# Patient Record
Sex: FEMALE | Race: WHITE | NOT HISPANIC OR LATINO
[De-identification: names, ages, dates, MRNs, and addresses within clinical notes are randomized per-mention and may not be internally consistent; named-entity substitution may affect disease eponyms.]

---

## 2019-03-29 PROBLEM — Z00.00 ENCOUNTER FOR PREVENTIVE HEALTH EXAMINATION: Status: ACTIVE | Noted: 2019-03-29

## 2019-04-17 ENCOUNTER — APPOINTMENT (OUTPATIENT)
Dept: MAMMOGRAPHY | Facility: CLINIC | Age: 64
End: 2019-04-17
Payer: MEDICARE

## 2019-04-17 ENCOUNTER — APPOINTMENT (OUTPATIENT)
Dept: RADIOLOGY | Facility: CLINIC | Age: 64
End: 2019-04-17
Payer: MEDICARE

## 2019-04-17 PROCEDURE — 77063 BREAST TOMOSYNTHESIS BI: CPT

## 2019-04-17 PROCEDURE — 77080 DXA BONE DENSITY AXIAL: CPT

## 2019-04-17 PROCEDURE — 77067 SCR MAMMO BI INCL CAD: CPT

## 2019-04-23 ENCOUNTER — OUTPATIENT (OUTPATIENT)
Dept: OUTPATIENT SERVICES | Facility: HOSPITAL | Age: 64
LOS: 1 days | End: 2019-04-23

## 2019-11-23 ENCOUNTER — TRANSCRIPTION ENCOUNTER (OUTPATIENT)
Age: 64
End: 2019-11-23

## 2019-12-11 ENCOUNTER — APPOINTMENT (OUTPATIENT)
Dept: RADIOLOGY | Facility: CLINIC | Age: 64
End: 2019-12-11
Payer: MEDICARE

## 2019-12-11 PROCEDURE — 71046 X-RAY EXAM CHEST 2 VIEWS: CPT

## 2019-12-15 ENCOUNTER — TRANSCRIPTION ENCOUNTER (OUTPATIENT)
Age: 64
End: 2019-12-15

## 2020-10-13 ENCOUNTER — APPOINTMENT (OUTPATIENT)
Dept: ULTRASOUND IMAGING | Facility: CLINIC | Age: 65
End: 2020-10-13

## 2020-12-02 ENCOUNTER — APPOINTMENT (OUTPATIENT)
Dept: MAMMOGRAPHY | Facility: CLINIC | Age: 65
End: 2020-12-02
Payer: MEDICARE

## 2020-12-02 PROCEDURE — 77063 BREAST TOMOSYNTHESIS BI: CPT

## 2020-12-02 PROCEDURE — 77067 SCR MAMMO BI INCL CAD: CPT

## 2021-05-31 ENCOUNTER — EMERGENCY (EMERGENCY)
Facility: HOSPITAL | Age: 66
LOS: 1 days | End: 2021-05-31
Admitting: EMERGENCY MEDICINE
Payer: MEDICARE

## 2021-05-31 PROCEDURE — 70450 CT HEAD/BRAIN W/O DYE: CPT | Mod: 26,59

## 2021-05-31 PROCEDURE — 99285 EMERGENCY DEPT VISIT HI MDM: CPT

## 2021-05-31 PROCEDURE — 93010 ELECTROCARDIOGRAM REPORT: CPT

## 2021-05-31 PROCEDURE — 70496 CT ANGIOGRAPHY HEAD: CPT | Mod: 26

## 2021-05-31 PROCEDURE — 70498 CT ANGIOGRAPHY NECK: CPT | Mod: 26

## 2021-05-31 PROCEDURE — 71045 X-RAY EXAM CHEST 1 VIEW: CPT | Mod: 26

## 2021-06-01 ENCOUNTER — INPATIENT (INPATIENT)
Facility: HOSPITAL | Age: 66
LOS: 0 days | Discharge: HOME | DRG: 103 | End: 2021-06-01
Attending: NEUROLOGICAL SURGERY | Admitting: NEUROLOGICAL SURGERY
Payer: COMMERCIAL

## 2021-06-01 ENCOUNTER — TRANSCRIPTION ENCOUNTER (OUTPATIENT)
Age: 66
End: 2021-06-01

## 2021-06-01 VITALS
TEMPERATURE: 98 F | RESPIRATION RATE: 16 BRPM | DIASTOLIC BLOOD PRESSURE: 64 MMHG | HEART RATE: 66 BPM | SYSTOLIC BLOOD PRESSURE: 131 MMHG | OXYGEN SATURATION: 99 %

## 2021-06-01 VITALS
DIASTOLIC BLOOD PRESSURE: 72 MMHG | OXYGEN SATURATION: 100 % | SYSTOLIC BLOOD PRESSURE: 124 MMHG | TEMPERATURE: 98 F | HEART RATE: 60 BPM | RESPIRATION RATE: 16 BRPM

## 2021-06-01 DIAGNOSIS — Z98.891 HISTORY OF UTERINE SCAR FROM PREVIOUS SURGERY: Chronic | ICD-10-CM

## 2021-06-01 DIAGNOSIS — I67.1 CEREBRAL ANEURYSM, NONRUPTURED: ICD-10-CM

## 2021-06-01 LAB
A1C WITH ESTIMATED AVERAGE GLUCOSE RESULT: 5.4 % — SIGNIFICANT CHANGE UP (ref 4–5.6)
ANION GAP SERPL CALC-SCNC: 8 MMOL/L — SIGNIFICANT CHANGE UP (ref 5–17)
APPEARANCE CSF: CLEAR — SIGNIFICANT CHANGE UP
APPEARANCE SPUN FLD: COLORLESS — SIGNIFICANT CHANGE UP
BUN SERPL-MCNC: 11.9 MG/DL — SIGNIFICANT CHANGE UP (ref 8–20)
CALCIUM SERPL-MCNC: 8.9 MG/DL — SIGNIFICANT CHANGE UP (ref 8.6–10.2)
CHLORIDE SERPL-SCNC: 107 MMOL/L — SIGNIFICANT CHANGE UP (ref 98–107)
CHOLEST SERPL-MCNC: 146 MG/DL — SIGNIFICANT CHANGE UP
CO2 SERPL-SCNC: 22 MMOL/L — SIGNIFICANT CHANGE UP (ref 22–29)
COLOR CSF: SIGNIFICANT CHANGE UP
CREAT SERPL-MCNC: 0.65 MG/DL — SIGNIFICANT CHANGE UP (ref 0.5–1.3)
CSF COMMENTS: SIGNIFICANT CHANGE UP
CSF COMMENTS: SIGNIFICANT CHANGE UP
ESTIMATED AVERAGE GLUCOSE: 108 MG/DL — SIGNIFICANT CHANGE UP (ref 68–114)
FOLATE SERPL-MCNC: 12.9 NG/ML — SIGNIFICANT CHANGE UP
GLUCOSE CSF-MCNC: 72 MG/DLG/24H — HIGH (ref 40–70)
GLUCOSE SERPL-MCNC: 112 MG/DL — HIGH (ref 70–99)
GRAM STN FLD: SIGNIFICANT CHANGE UP
HCT VFR BLD CALC: 37.5 % — SIGNIFICANT CHANGE UP (ref 34.5–45)
HDLC SERPL-MCNC: 48 MG/DL — LOW
HGB BLD-MCNC: 11.6 G/DL — SIGNIFICANT CHANGE UP (ref 11.5–15.5)
LIPID PNL WITH DIRECT LDL SERPL: 77 MG/DL — SIGNIFICANT CHANGE UP
MAGNESIUM SERPL-MCNC: 1.9 MG/DL — SIGNIFICANT CHANGE UP (ref 1.6–2.6)
MCHC RBC-ENTMCNC: 30.9 GM/DL — LOW (ref 32–36)
MCHC RBC-ENTMCNC: 32.7 PG — SIGNIFICANT CHANGE UP (ref 27–34)
MCV RBC AUTO: 105.6 FL — HIGH (ref 80–100)
NEUTROPHILS # CSF: SIGNIFICANT CHANGE UP % (ref 0–6)
NON HDL CHOLESTEROL: 98 MG/DL — SIGNIFICANT CHANGE UP
NRBC NFR CSF: 2 /UL — SIGNIFICANT CHANGE UP (ref 0–5)
PHOSPHATE SERPL-MCNC: 2.8 MG/DL — SIGNIFICANT CHANGE UP (ref 2.4–4.7)
PLATELET # BLD AUTO: 154 K/UL — SIGNIFICANT CHANGE UP (ref 150–400)
POTASSIUM SERPL-MCNC: 4.7 MMOL/L — SIGNIFICANT CHANGE UP (ref 3.5–5.3)
POTASSIUM SERPL-SCNC: 4.7 MMOL/L — SIGNIFICANT CHANGE UP (ref 3.5–5.3)
PROT CSF-MCNC: 24 MG/DL — SIGNIFICANT CHANGE UP (ref 15–45)
RBC # BLD: 3.55 M/UL — LOW (ref 3.8–5.2)
RBC # CSF: 22 /CMM — HIGH (ref 0–1)
RBC # FLD: 12.8 % — SIGNIFICANT CHANGE UP (ref 10.3–14.5)
SODIUM SERPL-SCNC: 137 MMOL/L — SIGNIFICANT CHANGE UP (ref 135–145)
SPECIMEN SOURCE: SIGNIFICANT CHANGE UP
TRIGL SERPL-MCNC: 106 MG/DL — SIGNIFICANT CHANGE UP
TUBE TYPE: SIGNIFICANT CHANGE UP
VIT B12 SERPL-MCNC: 353 PG/ML — SIGNIFICANT CHANGE UP (ref 232–1245)
WBC # BLD: 5.17 K/UL — SIGNIFICANT CHANGE UP (ref 3.8–10.5)
WBC # FLD AUTO: 5.17 K/UL — SIGNIFICANT CHANGE UP (ref 3.8–10.5)

## 2021-06-01 PROCEDURE — 99234 HOSP IP/OBS SM DT SF/LOW 45: CPT

## 2021-06-01 PROCEDURE — 93010 ELECTROCARDIOGRAM REPORT: CPT

## 2021-06-01 PROCEDURE — 99232 SBSQ HOSP IP/OBS MODERATE 35: CPT

## 2021-06-01 PROCEDURE — 99239 HOSP IP/OBS DSCHRG MGMT >30: CPT

## 2021-06-01 PROCEDURE — 89051 BODY FLUID CELL COUNT: CPT

## 2021-06-01 PROCEDURE — 82746 ASSAY OF FOLIC ACID SERUM: CPT

## 2021-06-01 PROCEDURE — 97167 OT EVAL HIGH COMPLEX 60 MIN: CPT

## 2021-06-01 PROCEDURE — 36415 COLL VENOUS BLD VENIPUNCTURE: CPT

## 2021-06-01 PROCEDURE — 80061 LIPID PANEL: CPT

## 2021-06-01 PROCEDURE — 93306 TTE W/DOPPLER COMPLETE: CPT | Mod: 26

## 2021-06-01 PROCEDURE — 87070 CULTURE OTHR SPECIMN AEROBIC: CPT

## 2021-06-01 PROCEDURE — 82607 VITAMIN B-12: CPT

## 2021-06-01 PROCEDURE — 72148 MRI LUMBAR SPINE W/O DYE: CPT | Mod: 26

## 2021-06-01 PROCEDURE — 99285 EMERGENCY DEPT VISIT HI MDM: CPT | Mod: 25

## 2021-06-01 PROCEDURE — 70544 MR ANGIOGRAPHY HEAD W/O DYE: CPT | Mod: 26,59

## 2021-06-01 PROCEDURE — 87205 SMEAR GRAM STAIN: CPT

## 2021-06-01 PROCEDURE — 70551 MRI BRAIN STEM W/O DYE: CPT | Mod: 26

## 2021-06-01 PROCEDURE — 72148 MRI LUMBAR SPINE W/O DYE: CPT

## 2021-06-01 PROCEDURE — 80048 BASIC METABOLIC PNL TOTAL CA: CPT

## 2021-06-01 PROCEDURE — 83735 ASSAY OF MAGNESIUM: CPT

## 2021-06-01 PROCEDURE — 70544 MR ANGIOGRAPHY HEAD W/O DYE: CPT

## 2021-06-01 PROCEDURE — 70551 MRI BRAIN STEM W/O DYE: CPT

## 2021-06-01 PROCEDURE — 84100 ASSAY OF PHOSPHORUS: CPT

## 2021-06-01 PROCEDURE — 72141 MRI NECK SPINE W/O DYE: CPT

## 2021-06-01 PROCEDURE — 72141 MRI NECK SPINE W/O DYE: CPT | Mod: 26

## 2021-06-01 PROCEDURE — C8929: CPT

## 2021-06-01 PROCEDURE — 93005 ELECTROCARDIOGRAM TRACING: CPT

## 2021-06-01 PROCEDURE — 83036 HEMOGLOBIN GLYCOSYLATED A1C: CPT

## 2021-06-01 PROCEDURE — 62270 DX LMBR SPI PNXR: CPT

## 2021-06-01 PROCEDURE — 85027 COMPLETE CBC AUTOMATED: CPT

## 2021-06-01 PROCEDURE — 72146 MRI CHEST SPINE W/O DYE: CPT | Mod: 26

## 2021-06-01 PROCEDURE — 84157 ASSAY OF PROTEIN OTHER: CPT

## 2021-06-01 PROCEDURE — 72146 MRI CHEST SPINE W/O DYE: CPT

## 2021-06-01 PROCEDURE — 82945 GLUCOSE OTHER FLUID: CPT

## 2021-06-01 RX ORDER — HYDRALAZINE HCL 50 MG
10 TABLET ORAL
Refills: 0 | Status: DISCONTINUED | OUTPATIENT
Start: 2021-06-01 | End: 2021-06-01

## 2021-06-01 RX ORDER — CYCLOBENZAPRINE HYDROCHLORIDE 10 MG/1
0.5 TABLET, FILM COATED ORAL
Qty: 7 | Refills: 0
Start: 2021-06-01 | End: 2021-06-07

## 2021-06-01 RX ORDER — SODIUM CHLORIDE 9 MG/ML
1000 INJECTION INTRAMUSCULAR; INTRAVENOUS; SUBCUTANEOUS
Refills: 0 | Status: DISCONTINUED | OUTPATIENT
Start: 2021-06-01 | End: 2021-06-01

## 2021-06-01 RX ORDER — ACETAMINOPHEN 500 MG
650 TABLET ORAL EVERY 6 HOURS
Refills: 0 | Status: DISCONTINUED | OUTPATIENT
Start: 2021-06-01 | End: 2021-06-01

## 2021-06-01 RX ORDER — LABETALOL HCL 100 MG
10 TABLET ORAL
Refills: 0 | Status: DISCONTINUED | OUTPATIENT
Start: 2021-06-01 | End: 2021-06-01

## 2021-06-01 RX ORDER — ALPRAZOLAM 0.25 MG
1 TABLET ORAL
Qty: 3 | Refills: 0
Start: 2021-06-01 | End: 2021-06-03

## 2021-06-01 RX ORDER — ACETAMINOPHEN 500 MG
1000 TABLET ORAL ONCE
Refills: 0 | Status: COMPLETED | OUTPATIENT
Start: 2021-06-01 | End: 2021-06-01

## 2021-06-01 RX ORDER — ENOXAPARIN SODIUM 100 MG/ML
40 INJECTION SUBCUTANEOUS DAILY
Refills: 0 | Status: DISCONTINUED | OUTPATIENT
Start: 2021-06-01 | End: 2021-06-01

## 2021-06-01 RX ADMIN — Medication 0.5 MILLIGRAM(S): at 15:00

## 2021-06-01 RX ADMIN — Medication 400 MILLIGRAM(S): at 08:25

## 2021-06-01 RX ADMIN — Medication 650 MILLIGRAM(S): at 17:58

## 2021-06-01 RX ADMIN — Medication 1000 MILLIGRAM(S): at 08:55

## 2021-06-01 NOTE — ED ADULT NURSE REASSESSMENT NOTE - NS ED NURSE REASSESS COMMENT FT1
MD Ferguson and MD Stringer performed LP. Pt remained connected to continuous cardiac and pulse ox monitor for procedure. Pt laying flat for 1 hour. Neuro ICU at the bedside for a consult.

## 2021-06-01 NOTE — H&P ADULT - ASSESSMENT
65 year old female with a PMHx of HTN and anxiety presents as a transfer [place] [facility] after being found to have an ACOMM aneurysm. Earlier today she started to develop numbness and tingling down her LEFT arm and LEFT leg that prompted her to come to the ED. Upon work up CTH/CTA/CTP all performed and no acute intracranial finding or LVO was noted other then a 3mm ACOMM aneurysm. Patient was also found to have a low grade temp of 100.7 and was given a one time dose of Rocephin. She is currently now s/p LP performed in the ER.     -LP with +RBC  -ICU q 1 neuro checks  -SAH protocol  -IV Tylenol for HA  -Pre op for cerebral angiogram   -CTH 6am

## 2021-06-01 NOTE — ED PROVIDER NOTE - PHYSICAL EXAMINATION
General: Well appearing female in no acute distress. VSS on arrival   HEENT: Normocephalic, atraumatic. Moist mucous membranes. Oropharynx clear. No lymphadenopathy.  Eyes: No scleral icterus. EOMI. IRENA.  Neck:. Soft and supple. Full ROM without pain. No midline tenderness  Cardiac: Regular rate and regular rhythm. No murmurs, rubs, gallops. Peripheral pulses 2+ and symmetric. No LE edema.  Resp: Lungs CTAB. Speaking in full sentences. No wheezes, rales or rhonchi.  Abd: Soft, non-tender, non-distended. No guarding or rebound. No scars, masses, or lesions.  Back: Spine midline and non-tender. No CVA tenderness.    Skin: No rashes, abrasions, or lacerations.  Neuro: AO x 3. Moves all extremities symmetrically. Motor strength and sensation grossly intact.

## 2021-06-01 NOTE — OCCUPATIONAL THERAPY INITIAL EVALUATION ADULT - PERTINENT HX OF CURRENT PROBLEM, REHAB EVAL
Pt presents to ED as a transfer from [facility] for lumbar puncture. As per [facility] documents, pt with 100.7 fever and with CT scan head showing "3mm intracanicular artery aneursym.". CTA neck showed "patent cervical vasculture with no flow limiting stenosis or occlusion." Patient c/o headaches the last 3 days, left jaw pain, left UE/LE numbness and tingling. Pt s/p lumbar puncture which was positive for xanthochromia.

## 2021-06-01 NOTE — DISCHARGE NOTE PROVIDER - HOSPITAL COURSE
65 year old female with a PMHx of HTN and anxiety presents as a transfer from [facility] after being found to have an ACOMM aneurysm. Patient state that this past Thursday she started to develop headaches and by Friday her headache was so severe she went to her PMD. At that time she was prescribed tramadol and given a second agent for her HTN and sent home. Patient state that she did have some relief with the medication but still had headaches and were associated with nausea. Throughout the weekend again still with persistent headaches and started to develop LEFT sided jaw pain that kept her from fully opening her mouth. Earlier today she started to develop numbness and tingling down her LEFT arm and LEFT leg that prompted her to come to the ED. Upon work up CTH/CTA/CTP all performed and no acute intracranial finding or LVO was noted other then a 3mm ACOMM aneurysm. Patient was also found to have a low grade temp of 100.7 and was given a one time dose of Rocephin. She is s/p LP ny er and mr angio head done which was negative    tte negative  pt cleared patient and patient did admit to having stress and anxiety and hasn't   been sleeping well.     patient advised to see dr espinoza as outpatient and she is in agreement with plan     patuient denies sick contacts recent travel, confusion, dysuria, cough, diarrhea or any other complaints    time spent on dc 34 minutes    pe  PHYSICAL EXAM:    GENERAL: NAD, well-groomed, well-developed  HEAD:  Atraumatic, Normocephalic  EYES: EOMI, PERRLA, conjunctiva and sclera clear  ENMT: No tonsillar erythema, exudates, or enlargement; Moist mucous membranes, Good dentition, No lesions  NECK: Supple, tender along spine   NERVOUS SYSTEM:  Alert & Oriented X3, Good concentration;  CHEST/LUNG: Clear to percussion bilaterally; No rales, rhonchi, wheezing, or rubs  HEART: Regular rate and rhythm; No murmurs, rubs, or gallops  ABDOMEN: Soft, Nontender, Nondistended; Bowel sounds present  EXTREMITIES:  2+ Peripheral Pulses, No clubbing, cyanosis, or edema  LYMPH: No lymphadenopathy noted  SKIN: No rashes or lesions

## 2021-06-01 NOTE — ED ADULT NURSE NOTE - OBJECTIVE STATEMENT
Pt BIBEMS as transfer from [place] for LP and neuro evaluation. Pt recently dx with a brain aneurysm and is experiencing facial numbness. Pt connected to continuous cardiac and pulse ox monitor. MD at the bedside to evaluate.

## 2021-06-01 NOTE — H&P ADULT - NSHPPHYSICALEXAM_GEN_ALL_CORE
Constitutional: NAD WDWN    Neuro  * Mental Status:  GCS 15: Awake, alert, oriented to conversation. During conversation noted to have what seemed like a mild LEFT facial but upon full exam negative (patient with pain tot he LEFT jaw)   * Cranial Nerves: Cnii-Cnxii grossly intact. PERRL, EOMI, tongue midline, no gaze deviation. +peripheral vision loss (chronic)   * Motor: RUE 5/5, LUE 5/5, RLE 5/5, LLE 5/5  * Sensory: Sensation intact to light touch  * Gait: Not assessed    Cardiovascular:  S1, S2 no murmurs appreciated.  Regular rate and rhythm.  Eyes: See neurologic examination with detailed examination of eyes.  ENT: No JVD, Trachea Midline.  Respiratory: Clear to auscultation.  Gastrointestinal: Soft, nontender, nondistended.  Musculoskeletal: No muscle wasting noted, No pretibial edema appreciated, no appreciable calf tenderness.

## 2021-06-01 NOTE — ED PROVIDER NOTE - CLINICAL SUMMARY MEDICAL DECISION MAKING FREE TEXT BOX
64 y/o F hx of HTN, anxiety transferred from [facility] for lumbar puncture. CT scan head showed 3mm aneurysm.   febrile to 100.7 at [facility], headaches for 3 days w/ L sided weakness   LP performed in lateral decubitus position, clear CSF, sent for culture, cell count, gram stain, glucose and protein. opening pressure of 23 obtained.   neurosurg ICU consulted for 3mm brain aneurysm, will admit

## 2021-06-01 NOTE — CONSULT NOTE ADULT - SUBJECTIVE AND OBJECTIVE BOX
Patient is a 65y old  Female who presents with a chief complaint of headache      HPI: 65F with PMHx of HTN, anxiety complains of headache since Thursday that has been worsening and left upper extremity / right upper extremities numbness and paresthesia. Pt presented to [facility] where she was found on CTA to have an ACOM aneurysm with no evidence of hemorrhage on CT scan. LP was performed that was positive for xanthochromia.     PAST MEDICAL & SURGICAL HISTORY:  HTN (hypertension)    Anxiety    Limited peripheral vision    H/O  section      FAMILY HISTORY:      SOCIAL HISTORY:  Tobacco Use: Former smoker 20 years ago   EtOH use: Occasional wine drinker   Substance: Denies     Allergies    sulfa drugs (Unknown)    Intolerances    REVIEW OF SYSTEMS  Negative except as noted in HPI  CONSTITUTIONAL: No fever, weight loss, or fatigue  EYES: No eye pain, visual disturbances, or discharge  ENMT:  No difficulty hearing, tinnitus, vertigo; No sinus or throat pain  NECK: No pain or stiffness  BREASTS: No pain, masses, or nipple discharge  RESPIRATORY: No cough, wheezing, chills or hemoptysis; No shortness of breath  CARDIOVASCULAR: No chest pain, palpitations, dizziness, or leg swelling  GASTROINTESTINAL: No abdominal or epigastric pain. No nausea, vomiting, or hematemesis; No diarrhea or constipation. No melena or hematochezia.  GENITOURINARY: No dysuria, frequency, hematuria, or incontinence  NEUROLOGICAL: + headaches, + numbness on the left, + paresthesias on the left ,No memory loss, loss of strength, or tremors  SKIN: No itching, burning, rashes, or lesions   LYMPH NODES: No enlarged glands  ENDOCRINE: No heat or cold intolerance; No hair loss  MUSCULOSKELETAL: No joint pain or swelling; No muscle, back, or extremity pain  PSYCHIATRIC: No depression, anxiety, mood swings, or difficulty sleeping  HEME/LYMPH: No easy bruising, or bleeding gums  ALLERY AND IMMUNOLOGIC: No hives or eczema    HOME MEDICATIONS:  Home Medications:      MEDICATIONS:  Antibiotics:    Neuro:    Anticoagulation:    OTHER:  hydrALAZINE Injectable 10 milliGRAM(s) IV Push every 2 hours PRN  labetalol Injectable 10 milliGRAM(s) IV Push every 2 hours PRN    IVF:  sodium chloride 0.9%. 1000 milliLiter(s) IV Continuous <Continuous>      Vital Signs Last 24 Hrs  T(C): 36.9 (2021 03:04), Max: 36.9 (2021 03:04)  T(F): 98.5 (2021 03:04), Max: 98.5 (2021 03:04)  HR: 60 (2021 03:04) (60 - 60)  BP: 124/72 (2021 03:04) (124/72 - 124/72)  BP(mean): --  RR: 16 (2021 03:04) (16 - 16)  SpO2: 100% (2021 03:04) (100% - 100%)      PHYSICAL EXAM:  GENERAL: NAD, well-groomed, well-developed  HEAD:  Atraumatic, normocephalic  EYES: Conjunctiva and sclera clear; corneal reflex intact  ENMT: No tonsillar erythema, exudates, or enlargement; moist mucous membranes, good dentition, no lesions  NECK: Supple, no JVD, normal thyroid  YAMILE COMA SCORE: E-4 V-5 M-6 = 15  MENTAL STATUS: AAO x3; Awake; Opens eyes spontaneously; Appropriately conversant without aphasia; following simple commands  CRANIAL NERVES: Visual acuity normal for age, + right side right hemianopsia which patient says is baseline, PERRL. EOMI without nystagmus. Facial sensation intact V1-3 distribution b/l. Face symmetric w/ normal eye closure and smile, tongue midline. Hearing grossly intact. Speech clear. Head turning and shoulder shrug intact.   REFLEXES: PERRL.  MOTOR: strength 5/5 b/l upper and lower extremities  SENSATION: grossly intact to light touch all extremities, but pt endorses paresthesia and numbness to LUE/LLE  COORDINATION: Gait intact; rapid alternating movements intact b/l upper extremities; no upper extremity dysmetria    CHEST/LUNG: Clear to auscultation bilaterally; no rales, rhonchi, wheezing, or rubs  HEART: +S1/+S2; Regular rate and rhythm; no murmurs, rubs, or gallops  ABDOMEN: Soft, nontender, nondistended; bowel sounds present all four quadrants  EXTREMITIES:  2+ peripheral pulses, no clubbing, cyanosis, or edema  LYMPH: No lymphadenopathy noted  SKIN: Warm, dry; no rashes or lesions    LABS:                CULTURES:      RADIOLOGY & ADDITIONAL STUDIES:  CTA: Acom aneurysm     CAPRINI SCORE [CLOT]:  Patient has an estimated Caprini score of greater than 5.  However, the patient's unique clinical situation will be addressed in an individual manner to determine appropriate anticoagulation treatment, if any.

## 2021-06-01 NOTE — PHYSICAL THERAPY INITIAL EVALUATION ADULT - ADDITIONAL COMMENTS
Pt. lives with her  in a house with 3 [address] with b/l rails in reach and no stairs inside. Pt. is retired and her  works during the day. Independent PTA and odes not own DME. Pt. lives with her  in a house with 3 [address] with b/l rails in reach and no stairs inside. Pt. is retired and her  is at home to assist her as needed. Independent PTA and odes not own DME.

## 2021-06-01 NOTE — OCCUPATIONAL THERAPY INITIAL EVALUATION ADULT - MODIFIED CLINICAL TEST OF SENSORY INTEGRATION IN BALANCE TEST
pt c/o intermittent left UE/LE "nerve pain...shock" since admission; pt with +capillary refill in bilateral toes and bilateral hand digits

## 2021-06-01 NOTE — ED PROVIDER NOTE - ATTENDING CONTRIBUTION TO CARE
66 yo F transferred from [place] for further evaluation by Neurosurgery and to have lumbar puncture performed after presenting with c/o frontal headache and LUE/LLE tingling with "uncontrolled"  jerking movements.  Pt with reported fever 100.7 and subsequently found ti have 3 mm aneurysm on head CT.  Pt received Rocephin by ED prior to transfer.  On exam pt afebrile awake and alert in NAD, PERRL, no photophobia, Neck supple, FROM, Cor Reg, Lungs clear b/l, Abd soft, NT, Ext FROM, Neuro CN 2-12 intact, MS 5/5 b/l UE/LE's.  LP performed with clear colorless CSF, sent for studies and pt accepted to Neurosurgery

## 2021-06-01 NOTE — PROGRESS NOTE ADULT - SUBJECTIVE AND OBJECTIVE BOX
65 year old female with a PMHx of HTN and anxiety [place] [facility] [place] being found to have an ACOMM aneurysm. Patient state that this past Thursday she started to develop headaches and by Friday her headache was so severe she went to her PMD. At that time she was prescribed tramadol and given a second agent for her HTN and sent home. Patient state that she did have some relief with the medication but still had headaches and were associated with nausea. Throughout the weekend again still with persistent headaches and started to develop LEFT sided jaw pain that kept her from fully opening her mouth. Earlier today she started to develop numbness and tingling down her LEFT arm and LEFT leg that prompted her to come to the ED. Upon work up CTH/CTA/CTP all performed and no acute intracranial finding or LVO was noted other then a 3mm ACOMM aneurysm. Patient was also found to have a low grade temp of 100.7 and was given a one time dose of Rocephin. She is currently now s/p LP performed in the ER.  (01 Jun 2021 04:30)  Admitted to [facility] for observation.  LP with no xanthochromia.  MRI/MRA non-contributory.    Pt seen earlier this am.  ROS L UE and L LE paresthesia ad HA, otherwise negative.  No o/n events reported.  VS, labs, imaging reviewed.    Exam:  NAD, cooperative.  AOx4, speech fluent, comprehension intact, PER, EOMI, no visual deficit, motor - 5/5 x4, sensation intact to LT, proprioception preserved.  CTAB  S1S2 present  Abd soft, NT  No peripheral swelling

## 2021-06-01 NOTE — ED ADULT NURSE NOTE - NSIMPLEMENTINTERV_GEN_ALL_ED
Implemented All Fall with Harm Risk Interventions:  [place] to call system. Call bell, personal items and telephone within reach. Instruct patient to call for assistance. Room bathroom lighting operational. Non-slip footwear when patient is off stretcher. Physically safe environment: no spills, clutter or unnecessary equipment. Stretcher in lowest position, wheels locked, appropriate side rails in place. Provide visual cue, wrist band, yellow gown, etc. Monitor gait and stability. Monitor for mental status changes and reorient to person, place, and time. Review medications for side effects contributing to fall risk. Reinforce activity limits and safety measures with patient and family. Provide visual clues: red socks.

## 2021-06-01 NOTE — H&P ADULT - HISTORY OF PRESENT ILLNESS
65 year old female with a PMHx of HTN and anxiety [place] [facility] [place] being found to have an ACOMM aneurysm. Patient state that this past Thursday she started to develop headaches and by Friday her headache was so severe she went to her PMD. At that time she was prescribed tramadol and given a second agent for her HTN and sent home. Patient state that she did have some relief with the medication but still had headaches and were associated with nausea. Throughout the weekend again still with persistent headaches and started to develop LEFT sided jaw pain that kept her from fully opening her mouth. Earlier today she started to develop numbness and tingling down her LEFT arm and LEFT leg that prompted her to come to the ED. Upon work up CTH/CTA/CTP all performed and no acute intracranial finding or LVO was noted other then a 3mm ACOMM aneurysm. Patient was also found to have a low grade temp of 100.7 and was given a one time dose of Rocephin. She is currently now s/p LP performed in the ER.

## 2021-06-01 NOTE — ED PROVIDER NOTE - OBJECTIVE STATEMENT
66 y/o F hx of HTN, anxiety transferred from [facility] for lumbar puncture. Per documents, documented fever of 100.7 at [facility], patient received dose of rocephin at 2121 on 5/31/21. CT scan head showed "3mm intracanicular artery aneursym". CTA neck showed "patent cervical vasculture w/ no flow limiting stenosis or occlusion." Patient states that she has been having headaches for the last 3 days. Endorses headaches that are frontal and occipital in nature. Endorses pain in L side of jaw and numbness and "tingling" down the L arm and L lower extremity. States she saw PMD 2 days ago and was prescirbed tramadol for headache. Denies nausea or vomiting. Denies trauma to the head. Denies recent travel. Denies chest pain or shortness of breath. Denies vision changes.

## 2021-06-01 NOTE — ED ADULT NURSE NOTE - CHIEF COMPLAINT QUOTE
[place] [facility] Pt. complaining of headaches and tingling of her left arm and left leg. Pt. was found to have aneurysm at  [facility]. Pt. send here for LP. fever of 100.6 at [facility]. Pt. received Rocephin PTA.

## 2021-06-01 NOTE — PROGRESS NOTE ADULT - ASSESSMENT
65 year old female with a PMHx of HTN and anxiety, transferred with c/o WHOL, unilateral paresthesias and with possible Acomm aneurysm on CTA.  W/up negative for SAH. MRI/MRA with no significant pathology, no evidence of Acomm aneurysm.    Neurochecks q4hrs, stable to be transferred to the floor  Discussed with NSGy and ERIC  Medicine involvement appreciated, pt is being evaluated for d/c, PT clearance  Neurology involvement requested, pt will be followed on an outpt basis

## 2021-06-01 NOTE — ED PROVIDER NOTE - NS ED ROS FT
General: Endorses fever, chills  HEENT: Denies sensory changes, sore throat  Neck: Denies neck pain, neck stiffness  Resp: Denies coughing, SOB  Cardiovascular: Denies CP, palpitations, LE edema  GI: Denies nausea, vomiting, abdominal pain, diarrhea, constipation, blood in stool  : Denies dysuria, hematuria, frequency, incontinence  MSK: Denies back pain  Neuro: Endorses HA, Denies dizziness, numbness, weakness  Skin: Denies rashes.

## 2021-06-01 NOTE — DISCHARGE NOTE PROVIDER - NSDCMRMEDTOKEN_GEN_ALL_CORE_FT
butalbital/acetaminophen/caffeine 50 mg-300 mg-40 mg oral capsule: 1 cap(s) orally every 8 hours, As Needed MDD:3 tabs  cyclobenzaprine 5 mg oral tablet: 0.5 tab(s) orally 2 times a day, As Needed MDD:2 tabs  Xanax 0.25 mg oral tablet: 1 tab(s) orally once a day (at bedtime), As Needed MDD:1

## 2021-06-01 NOTE — ED ADULT TRIAGE NOTE - CHIEF COMPLAINT QUOTE
[place] [facility] Pt. complaining of headaches and tingling of her left arm and left leg. Pt. was found to have aneurysm at  [facility]. Pt. send here for LP. fever of 100.6 at [facility]. Pt. received Rocephin PTA.

## 2021-06-01 NOTE — PHYSICAL THERAPY INITIAL EVALUATION ADULT - PERTINENT HX OF CURRENT PROBLEM, REHAB EVAL
65F with PMHx of HTN, anxiety complains of headache since Thursday that has been worsening and left upper extremity / right upper extremities numbness and paresthesia. [place] [facility] [place] found on CT[place] to have an ACOM aneurysm with no evidence of hemorrhage on CT scan. LP was performed that was positive for RBC.

## 2021-06-01 NOTE — OCCUPATIONAL THERAPY INITIAL EVALUATION ADULT - ADDITIONAL COMMENTS
Pt lives in house with 3 [address] and no steps inside; bedroom and bathroom are on main level. Bathroom has shower stall with doors and open tub. Pt does not own any DME. Pt is right handed. Pt drives. Pt's  is able and available to assist upon discharge as needed.

## 2021-06-01 NOTE — DISCHARGE NOTE PROVIDER - NSDCCPCAREPLAN_GEN_ALL_CORE_FT
PRINCIPAL DISCHARGE DIAGNOSIS  Diagnosis: Brain aneurysm  Assessment and Plan of Treatment: RULED OUT      SECONDARY DISCHARGE DIAGNOSES  Diagnosis: Migraine  Assessment and Plan of Treatment:

## 2021-06-01 NOTE — DISCHARGE NOTE NURSING/CASE MANAGEMENT/SOCIAL WORK - PATIENT PORTAL LINK FT
You can access the FollowMyHealth Patient Portal offered by [facility] by registering at the following website: [email or web address]. By joining [facility]’s FollowMyHealth portal, you will also be able to view your health information using other applications (apps) compatible with our system.

## 2021-06-01 NOTE — DISCHARGE NOTE PROVIDER - PROVIDER TOKENS
FREE:[LAST:[primary care],PHONE:[(   )    -],FAX:[(   )    -],ADDRESS:[pcp]],PROVIDER:[TOKEN:[[id number]]] PROVIDER:[TOKEN:[[id number]]],FREE:[LAST:[primary care],PHONE:[(   )    -],FAX:[(   )    -],ADDRESS:[pcp]]

## 2021-06-01 NOTE — H&P ADULT - NSHPREVIEWOFSYSTEMS_GEN_ALL_CORE
Constitutional: + fever, no chills   Eyes: No double vision, no change in visual acuity, no blurry vision, no occular discharge (patient with know peripheral vision loss)  Neurologic: + headaches and tingling to the LEFT side, No new weakness, no seizure reported   Ears, nose, mouth throat:  No ottorrhea. No change in hearing, No anosmia, No oral lesions, No sore throat  Cardiovascular: No palpitations, no chest pain, no nocturnal or positional dyspnea.  Respiratory: No shortness of breath, No Cough  Gastrointestinal: No change in bowel habits, no change in appetite  Genitourinary: No Frequency, No Dysuria, no Hematuria  Musculoskeletal: No muscle wasting, No new weakness  Psych: No changes in mood, Denies drug use, Denies history of psychiatric illness  Integumentary: Denies new skin lesions  Endocrine: Denies history of DM, denies history of thyroid disease, No heat or cold intolerance  Heme/Lymph: No use of antiplatelet/anticoagulant      All other systems reviewed and are negative

## 2021-06-01 NOTE — DISCHARGE NOTE PROVIDER - CARE PROVIDERS DIRECT ADDRESSES
,DirectAddress_Unknown,[email or web address],DirectAddress_Unknown

## 2021-06-01 NOTE — OCCUPATIONAL THERAPY INITIAL EVALUATION ADULT - SPECIAL TRAINING, OT EVAL
Functional mobility for short distances at bedside with contact guard assistance due to decreased strength, decreased postural control and decreased balance; cues for foot placement, body positioning and weightshifting. Pt requires increased time and verbal/tactile cues throughout mobility for safety. Distance limited by pt c/o pain and needing to sit. RN made aware.

## 2021-06-01 NOTE — PATIENT PROFILE ADULT - STATED REASON FOR ADMISSION
Pt states she was experiencing a headache this morning so she went to [facility],

## 2021-06-01 NOTE — CONSULT NOTE ADULT - SUBJECTIVE AND OBJECTIVE BOX
Patient is a 65y old  Female who presents with a chief complaint of headache      HPI: 65F with PMHx of HTN, anxiety complains of headache since Thursday that has been worsening and left upper extremity / right upper extremities numbness and paresthesia. [place] [facility] [place] found on CT[place] to have an ACOM aneurysm with no evidence of hemorrhage on CT scan. LP was performed that was positive for xanthochromia.     PAST MEDICAL & SURGICAL HISTORY:  HTN (hypertension)    Anxiety    Limited peripheral vision    H/O  section      FAMILY HISTORY:      SOCIAL HISTORY:  Tobacco Use: Former smoker 20 years ago   EtOH use: Occasional wine drinker   Substance: Denies     Allergies    sulfa drugs (Unknown)    Intolerances    REVIEW OF SYSTEMS  Negative except as noted in HPI  CONSTITUTIONAL: No fever, weight loss, or fatigue  EYES: No eye pain, visual disturbances, or discharge  ENMT:  No difficulty hearing, tinnitus, vertigo; No sinus or throat pain  NECK: No pain or stiffness  BREASTS: No pain, masses, or nipple discharge  RESPIRATORY: No cough, wheezing, chills or hemoptysis; No shortness of breath  CARDIOVASCULAR: No chest pain, palpitations, dizziness, or leg swelling  GASTROINTESTINAL: No abdominal or epigastric pain. No nausea, vomiting, or hematemesis; No diarrhea or constipation. No melena or hematochezia.  GENITOURINARY: No dysuria, frequency, hematuria, or incontinence  NEUROLOGICAL: + headaches, + numbness on the left, + paresthesias on the left ,No memory loss, loss of strength, or tremors  SKIN: No itching, burning, rashes, or lesions   LYMPH NODES: No enlarged glands  ENDOCRINE: No heat or cold intolerance; No hair loss  MUSCULOSKELETAL: No joint pain or swelling; No muscle, back, or extremity pain  PSYCHIATRIC: No depression, anxiety, mood swings, or difficulty sleeping  HEME/LYMPH: No easy bruising, or bleeding gums  ALLERY AND IMMUNOLOGIC: No hives or eczema    HOME MEDICATIONS:  Home Medications:      MEDICATIONS:  Antibiotics:    Neuro:    Anticoagulation:    OTHER:  hydrALAZINE Injectable 10 milliGRAM(s) IV Push every 2 hours PRN  labetalol Injectable 10 milliGRAM(s) IV Push every 2 hours PRN    IVF:  sodium chloride 0.9%. 1000 milliLiter(s) IV Continuous <Continuous>      Vital Signs Last 24 Hrs  T(C): 36.9 (2021 03:04), Max: 36.9 (2021 03:04)  T(F): 98.5 (2021 03:04), Max: 98.5 (2021 03:04)  HR: 60 (2021 03:04) (60 - 60)  BP: 124/72 (2021 03:04) (124/72 - 124/72)  BP(mean): --  RR: 16 (2021 03:04) (16 - 16)  SpO2: 100% (2021 03:04) (100% - 100%)      PHYSICAL EXAM:  GENERAL: NAD, well-groomed, well-developed  HEAD:  Atraumatic, normocephalic  EYES: Conjunctiva and sclera clear; corneal reflex intact  ENMT: No tonsillar erythema, exudates, or enlargement; moist mucous membranes, good dentition, no lesions  NECK: Supple, no JVD, normal thyroid  YAMILE COMA SCORE: E-4 V-5 M-6 = 15  MENTAL STATUS: AAO x3; Awake; Opens eyes spontaneously; Appropriately conversant without aphasia; following simple commands  CRANIAL NERVES: Visual acuity normal for age, + right side right hemianopsia which patient says is baseline, PERRL. EOMI without nystagmus. Facial sensation intact V1-3 distribution b/l. Face symmetric w/ normal eye closure and smile, tongue midline. Hearing grossly intact. Speech clear. Head turning and shoulder shrug intact.   REFLEXES: PERRL.  MOTOR: strength 5/5 b/l upper and lower extremities  SENSATION: grossly intact to light touch all extremities, but pt endorses paresthesia and numbness to LUE/LLE  COORDINATION: Gait intact; rapid alternating movements intact b/l upper extremities; no upper extremity dysmetria    CHEST/LUNG: Clear to auscultation bilaterally; no rales, rhonchi, wheezing, or rubs  HEART: +S1/+S2; Regular rate and rhythm; no murmurs, rubs, or gallops  ABDOMEN: Soft, nontender, nondistended; bowel sounds present all four quadrants  EXTREMITIES:  2+ peripheral pulses, no clubbing, cyanosis, or edema  LYMPH: No lymphadenopathy noted  SKIN: Warm, dry; no rashes or lesions    LABS:                CULTURES:      RADIOLOGY & ADDITIONAL STUDIES:  CTA: Acom aneurysm     CAPRINI SCORE [CLOT]:  Patient has an estimated Caprini score of greater than 5.  However, the patient's unique clinical situation will be addressed in an individual manner to determine appropriate anticoagulation treatment, if any. Patient is a 65y old  Female who presents with a chief complaint of headache      HPI: 65F with PMHx of HTN, anxiety complains of headache since Thursday that has been worsening and left upper extremity / right upper extremities numbness and paresthesia. Pt presented to [facility] [place] where she was found on CTA to have an ACOM aneurysm with no evidence of hemorrhage on CT scan. LP was performed that was positive for RBC. HH 1, MF 0, NIH 0    PAST MEDICAL & SURGICAL HISTORY:  HTN (hypertension)    Anxiety    Limited peripheral vision    H/O  section      FAMILY HISTORY:      SOCIAL HISTORY:  Tobacco Use: Former smoker 20 years ago   EtOH use: Occasional wine drinker   Substance: Denies     Allergies    sulfa drugs (Unknown)    Intolerances    REVIEW OF SYSTEMS  Negative except as noted in HPI  CONSTITUTIONAL: No fever, weight loss, or fatigue  EYES: No eye pain, visual disturbances, or discharge  ENMT:  No difficulty hearing, tinnitus, vertigo; No sinus or throat pain  NECK: No pain or stiffness  BREASTS: No pain, masses, or nipple discharge  RESPIRATORY: No cough, wheezing, chills or hemoptysis; No shortness of breath  CARDIOVASCULAR: No chest pain, palpitations, dizziness, or leg swelling  GASTROINTESTINAL: No abdominal or epigastric pain. No nausea, vomiting, or hematemesis; No diarrhea or constipation. No melena or hematochezia.  GENITOURINARY: No dysuria, frequency, hematuria, or incontinence  NEUROLOGICAL: + headaches, + numbness on the left, + paresthesias on the left ,No memory loss, loss of strength, or tremors  SKIN: No itching, burning, rashes, or lesions   LYMPH NODES: No enlarged glands  ENDOCRINE: No heat or cold intolerance; No hair loss  MUSCULOSKELETAL: No joint pain or swelling; No muscle, back, or extremity pain  PSYCHIATRIC: No depression, anxiety, mood swings, or difficulty sleeping  HEME/LYMPH: No easy bruising, or bleeding gums  ALLERY AND IMMUNOLOGIC: No hives or eczema    HOME MEDICATIONS:  Home Medications:      MEDICATIONS:  Antibiotics:    Neuro:    Anticoagulation:    OTHER:  hydrALAZINE Injectable 10 milliGRAM(s) IV Push every 2 hours PRN  labetalol Injectable 10 milliGRAM(s) IV Push every 2 hours PRN    IVF:  sodium chloride 0.9%. 1000 milliLiter(s) IV Continuous <Continuous>      Vital Signs Last 24 Hrs  T(C): 36.9 (2021 03:04), Max: 36.9 (2021 03:04)  T(F): 98.5 (2021 03:04), Max: 98.5 (2021 03:04)  HR: 60 (2021 03:04) (60 - 60)  BP: 124/72 (2021 03:04) (124/72 - 124/72)  BP(mean): --  RR: 16 (2021 03:04) (16 - 16)  SpO2: 100% (2021 03:04) (100% - 100%)      PHYSICAL EXAM:  GENERAL: NAD, well-groomed, well-developed  HEAD:  Atraumatic, normocephalic  EYES: Conjunctiva and sclera clear; corneal reflex intact  ENMT: No tonsillar erythema, exudates, or enlargement; moist mucous membranes, good dentition, no lesions  NECK: Supple, no JVD, normal thyroid  YAMILE COMA SCORE: E-4 V-5 M-6 = 15  MENTAL STATUS: AAO x3; Awake; Opens eyes spontaneously; Appropriately conversant without aphasia; following simple commands  CRANIAL NERVES: Visual acuity normal for age, + right side right hemianopsia which patient says is baseline, PERRL. EOMI without nystagmus. Facial sensation intact V1-3 distribution b/l. Face symmetric w/ normal eye closure and smile, tongue midline. Hearing grossly intact. Speech clear. Head turning and shoulder shrug intact.   REFLEXES: PERRL.  MOTOR: strength 5/5 b/l upper and lower extremities  SENSATION: grossly intact to light touch all extremities, but pt endorses paresthesia and numbness to LUE/LLE  COORDINATION: Gait intact; rapid alternating movements intact b/l upper extremities; no upper extremity dysmetria    CHEST/LUNG: Clear to auscultation bilaterally; no rales, rhonchi, wheezing, or rubs  HEART: +S1/+S2; Regular rate and rhythm; no murmurs, rubs, or gallops  ABDOMEN: Soft, nontender, nondistended; bowel sounds present all four quadrants  EXTREMITIES:  2+ peripheral pulses, no clubbing, cyanosis, or edema  LYMPH: No lymphadenopathy noted  SKIN: Warm, dry; no rashes or lesions    LABS:                CULTURES:      RADIOLOGY & ADDITIONAL STUDIES:  CTA: Acom aneurysm     CAPRINI SCORE [CLOT]:  Patient has an estimated Caprini score of greater than 5.  However, the patient's unique clinical situation will be addressed in an individual manner to determine appropriate anticoagulation treatment, if any.

## 2021-06-01 NOTE — DISCHARGE NOTE PROVIDER - CARE PROVIDER_API CALL
primary care,   [facility]  Phone: (   )    -  Fax: (   )    -  Follow Up Time:     Steven Kingsley; PhD)  Neurology; Vascular Neurology  [address] [facility], [facility] [address]  [place] [address]  Phone: [phone]  Fax: [phone]  Follow Up Time:    Steven Kingsley; PhD)  Neurology; Vascular Neurology  [address], [facility] [address]  [place] [address]  Phone: [phone]  Fax: [phone]  Follow Up Time:     primary care,   [facility]  Phone: (   )    -  Fax: (   )    -  Follow Up Time:

## 2021-06-01 NOTE — CHART NOTE - NSCHARTNOTEFT_GEN_A_CORE
66 yo female with PMH of HTN and anxiety presented to the [facility] ED headache stated to be "worst headache of her life". 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 Lumbar puncture was obtained and negative for xanthochromia. MRI was completed and was read as a normal study with the exception of a tiny Acomm aneurysm. Neuro exam findings are a subjective LUE/LLE numbness and parasthesia that is inhibiting her ability to walk. The word subjective is used because it was noted that her sensation was intact to light touch and pin prick. The patient is otherwise neurointact. There is no necessary intervention at this time for the aneurysm and it can be followed up outpatient. Main purpose of the remainder of her hospital stay is her clinical presentation that doesn't correlate with imaging or lab findings. Neurology is consulted and disposition will be based on their recommendations. Patient has been stable from cardiopulmonary standpoint without need for drips or BP medications, saturating well on RA, regular diet was started, Lovenox 40 started tonight for dvt ppx, patient has been afebrile, and TSH and   A1C are WNL. There have not been any lab abnormalities.

## 2021-06-01 NOTE — H&P ADULT - NSHPSOCIALHISTORY_GEN_ALL_CORE
Lives at home with   Retired banker   Former smoker, quit 20 years ago   Social drinker   Denies any drug use

## 2021-06-01 NOTE — OCCUPATIONAL THERAPY INITIAL EVALUATION ADULT - LEVEL OF INDEPENDENCE: GROOMING, OT EVAL
Discharge instructions given to EMS. Tramadol given PRN prior to transport. Patient sent home with [facility]. EMS indicated all understanding. tasks in sitting/supervision

## 2021-06-01 NOTE — CONSULT NOTE ADULT - ASSESSMENT
65F with headache, LUE/LLE paresthesias/numbness found to have ACOM aneurysm, LP with Xanthochromia     Plan   - q1 hr neuro checks   - angiogram   - Keep NPO for now   - -140   - Nimodipine 60q4   - SCDs only for DVT ppx 
65F with headache, LUE/LLE paresthesias/numbness found to have ACOM aneurysm, LP with Xanthochromia     Plan   Admit to NSICU     Neuro:   - q1 neuro checks   - LP with 22 RBC no other cells   - Will need angiogram   - f/u CTH   - Nimodipine when passes dysphagia     Cards:  - -140  - Hydralazine / Labetolol PRN   - Echo     Resp:   - Maintain SpO2 > 92%     GI:   - NPO   - Dysphagia screen     :  - NS @ 75/hr   - Ins and outs q2 hrs     ID:   - No active issues     Heme:   - SCDs only for DVT ppx     Endo:   - FS q6 while NPO   - HbA1c

## 2021-06-04 LAB
CULTURE RESULTS: SIGNIFICANT CHANGE UP
SPECIMEN SOURCE: SIGNIFICANT CHANGE UP

## 2021-06-21 ENCOUNTER — TRANSCRIPTION ENCOUNTER (OUTPATIENT)
Age: 66
End: 2021-06-21

## 2022-05-17 PROBLEM — H53.459: Chronic | Status: ACTIVE | Noted: 2021-06-01

## 2022-05-17 PROBLEM — F41.9 ANXIETY DISORDER, UNSPECIFIED: Chronic | Status: ACTIVE | Noted: 2021-06-01

## 2022-05-17 PROBLEM — I10 ESSENTIAL (PRIMARY) HYPERTENSION: Chronic | Status: ACTIVE | Noted: 2021-06-01

## 2022-08-01 ENCOUNTER — NON-APPOINTMENT (OUTPATIENT)
Age: 67
End: 2022-08-01

## 2022-10-19 ENCOUNTER — APPOINTMENT (OUTPATIENT)
Dept: RADIOLOGY | Facility: CLINIC | Age: 67
End: 2022-10-19

## 2022-10-19 ENCOUNTER — APPOINTMENT (OUTPATIENT)
Dept: MAMMOGRAPHY | Facility: CLINIC | Age: 67
End: 2022-10-19

## 2022-10-19 PROCEDURE — 77067 SCR MAMMO BI INCL CAD: CPT

## 2022-10-19 PROCEDURE — 77063 BREAST TOMOSYNTHESIS BI: CPT

## 2022-10-19 PROCEDURE — 77080 DXA BONE DENSITY AXIAL: CPT

## 2022-12-27 NOTE — PHYSICAL THERAPY INITIAL EVALUATION ADULT - PHYSICAL ASSIST/NONPHYSICAL ASSIST: SUPINE/SIT, REHAB EVAL
GLAUCOMA SUSPECT-C/D: The patient is a glaucoma suspect because of suspicious appearance of the optic disc(s). IOP today13/14. RTC in 6 months for HVF/RNFL/IOP check. (-) family HX. verbal cues/nonverbal cues (demo/gestures)

## 2024-07-13 ENCOUNTER — NON-APPOINTMENT (OUTPATIENT)
Age: 69
End: 2024-07-13

## 2024-07-16 ENCOUNTER — OFFICE (OUTPATIENT)
Dept: URBAN - METROPOLITAN AREA CLINIC 38 | Facility: CLINIC | Age: 69
Setting detail: OPHTHALMOLOGY
End: 2024-07-16
Payer: MEDICARE

## 2024-07-16 DIAGNOSIS — H43.812: ICD-10-CM

## 2024-07-16 DIAGNOSIS — H11.32: ICD-10-CM

## 2024-07-16 DIAGNOSIS — H25.13: ICD-10-CM

## 2024-07-16 PROCEDURE — 99203 OFFICE O/P NEW LOW 30 MIN: CPT | Performed by: OPTOMETRIST

## 2024-07-16 ASSESSMENT — CONFRONTATIONAL VISUAL FIELD TEST (CVF)
OS_FINDINGS: FULL
OD_FINDINGS: FULL

## 2024-07-16 ASSESSMENT — LID POSITION - DERMATOCHALASIS
OD_DERMATOCHALASIS: RUL 2+
OS_DERMATOCHALASIS: LUL 2+

## 2024-09-09 ENCOUNTER — APPOINTMENT (OUTPATIENT)
Dept: MAMMOGRAPHY | Facility: CLINIC | Age: 69
End: 2024-09-09

## 2024-09-30 ENCOUNTER — APPOINTMENT (OUTPATIENT)
Dept: MAMMOGRAPHY | Facility: CLINIC | Age: 69
End: 2024-09-30
Payer: MEDICARE

## 2024-09-30 PROCEDURE — 77067 SCR MAMMO BI INCL CAD: CPT

## 2024-09-30 PROCEDURE — 77063 BREAST TOMOSYNTHESIS BI: CPT

## 2025-06-06 ENCOUNTER — APPOINTMENT (OUTPATIENT)
Dept: OBGYN | Facility: CLINIC | Age: 70
End: 2025-06-06
